# Patient Record
Sex: FEMALE | Race: WHITE
[De-identification: names, ages, dates, MRNs, and addresses within clinical notes are randomized per-mention and may not be internally consistent; named-entity substitution may affect disease eponyms.]

---

## 2018-05-29 ENCOUNTER — HOSPITAL ENCOUNTER (EMERGENCY)
Dept: HOSPITAL 11 - JP.ED | Age: 58
Discharge: HOME | End: 2018-05-29
Payer: COMMERCIAL

## 2018-05-29 VITALS — DIASTOLIC BLOOD PRESSURE: 87 MMHG | SYSTOLIC BLOOD PRESSURE: 150 MMHG

## 2018-05-29 DIAGNOSIS — L08.9: ICD-10-CM
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
## 2018-05-29 DIAGNOSIS — F17.210: ICD-10-CM

## 2018-05-29 DIAGNOSIS — S00.461A: Primary | ICD-10-CM

## 2018-05-29 NOTE — EDM.PDOC
ED HPI GENERAL MEDICAL PROBLEM





- General


Chief Complaint: Bite:Animal, Insect


Stated Complaint: RIGHT EAR INFECTED  ?SPIDER BITE


Time Seen by Provider: 18 11:22


Source of Information: Reports: Patient


History Limitations: Reports: No Limitations





- History of Present Illness


INITIAL COMMENTS - FREE TEXT/NARRATIVE: 





pt arrived with a swollen rt ear lobe which is both itchy and tender, She was 

bite by an insect 3 days ago. Sh is not running a fever.  


Duration: Day(s):


Location: Reports: Face


Associated Symptoms: Reports: Other ( swollen rt ear lobe. )





- Related Data


 Allergies











Allergy/AdvReac Type Severity Reaction Status Date / Time


 


No Known Allergies Allergy   Verified 05/26/15 18:24











Home Meds: 


 Home Meds





Rosuvastatin Calcium 5 mg PO ASDIRECTED 18 [History]











Past Medical History


OB/GYN History: Reports: Pregnancy


Other Musculoskeletal History: finger reatatchment


Other Dermatologic History: exczema





- Past Surgical History


Female  Surgical History: Reports:  Section





Social & Family History





- Tobacco Use


Smoking Status *Q: Heavy Tobacco Smoker


Years of Tobacco use: 40


Packs/Tins Daily: 0.5





- Caffeine Use


Caffeine Use: Reports: Coffee, Soda





- Recreational Drug Use


Recreational Drug Use: No





ED ROS GENERAL





- Review of Systems


Review Of Systems: See Below


Constitutional: Reports: No Symptoms


HEENT: Reports: Other ( rt ear lobe swelling. )


Respiratory: Reports: No Symptoms


Cardiovascular: Reports: No Symptoms


Endocrine: Reports: No Symptoms


GI/Abdominal: Reports: No Symptoms


: Reports: No Symptoms


Neurological: Reports: No Symptoms


Psychiatric: Reports: No Symptoms





ED EXAM, ANIMAL BITE





- Physical Exam


Exam: See Below


Text/Narrative:: 





pt arrived with swelling of the rt ear lobe after being bite by a insect. She 

does not have a fever. 


Exam Limited By: No Limitations


General Appearance: Alert, Mild Distress


Ears: Normal TMs, Other ( rt ear lobe is swollen and tender. She states it also 

does itch. )


Nose: Normal Inspection


Throat/Mouth: Normal Inspection


Head: Atraumatic


Neck: Lymphadenopathy (R)


Respiratory/Chest: No Respiratory Distress


Cardiovascular: Regular Rate, Rhythm


Rectal (Female) Exam: Deferred


Back Exam: Normal Inspection


Extremities: Normal Inspection


Neurological: Alert, Oriented, Normal Cognition





Course





- Vital Signs


Last Recorded V/S: 


 Last Vital Signs











Temp  36.4 C   18 10:07


 


Pulse  87   18 10:07


 


Resp  18   18 10:07


 


BP  150/87 H  18 10:07


 


Pulse Ox  98   18 10:07














Departure





- Departure


Time of Disposition: 11:22


Disposition: Home, Self-Care 01


Condition: Fair


Clinical Impression: 


 Infected insect bite








- Discharge Information


Referrals: 


PCP,None [Primary Care Provider] - 


Forms:  ED Department Discharge


Care Plan Goals: 


moist warm packs to the rt ear lobe followed by a cool pack. Benadryl 50mg for 

itching q6h as needed, keflex 500mg tid